# Patient Record
Sex: FEMALE | Race: WHITE | NOT HISPANIC OR LATINO | URBAN - METROPOLITAN AREA
[De-identification: names, ages, dates, MRNs, and addresses within clinical notes are randomized per-mention and may not be internally consistent; named-entity substitution may affect disease eponyms.]

---

## 2020-12-07 ENCOUNTER — TELEPHONE (OUTPATIENT)
Dept: PSYCHIATRY | Facility: CLINIC | Age: 25
End: 2020-12-07

## 2024-07-04 ENCOUNTER — HOSPITAL ENCOUNTER (EMERGENCY)
Facility: HOSPITAL | Age: 29
Discharge: HOME | End: 2024-07-04
Attending: EMERGENCY MEDICINE
Payer: COMMERCIAL

## 2024-07-04 ENCOUNTER — APPOINTMENT (EMERGENCY)
Dept: CT IMAGING | Facility: HOSPITAL | Age: 29
End: 2024-07-04
Payer: COMMERCIAL

## 2024-07-04 VITALS
OXYGEN SATURATION: 100 % | WEIGHT: 176.59 LBS | SYSTOLIC BLOOD PRESSURE: 112 MMHG | HEART RATE: 84 BPM | TEMPERATURE: 97.5 F | DIASTOLIC BLOOD PRESSURE: 58 MMHG | RESPIRATION RATE: 16 BRPM

## 2024-07-04 DIAGNOSIS — N20.1 URETERAL STONE: Primary | ICD-10-CM

## 2024-07-04 LAB
ALBUMIN SERPL BCG-MCNC: 4.4 G/DL (ref 3.5–5)
ALP SERPL-CCNC: 49 U/L (ref 34–104)
ALT SERPL W P-5'-P-CCNC: 11 U/L (ref 7–52)
ANION GAP SERPL CALCULATED.3IONS-SCNC: 7 MMOL/L (ref 4–13)
AST SERPL W P-5'-P-CCNC: 13 U/L (ref 13–39)
BACTERIA UR QL AUTO: ABNORMAL /HPF
BASOPHILS # BLD AUTO: 0.04 THOUSANDS/ÂΜL (ref 0–0.1)
BASOPHILS NFR BLD AUTO: 0 % (ref 0–1)
BILIRUB SERPL-MCNC: 0.65 MG/DL (ref 0.2–1)
BILIRUB UR QL STRIP: ABNORMAL
BUN SERPL-MCNC: 17 MG/DL (ref 5–25)
CALCIUM SERPL-MCNC: 9.2 MG/DL (ref 8.4–10.2)
CHLORIDE SERPL-SCNC: 107 MMOL/L (ref 96–108)
CLARITY UR: ABNORMAL
CO2 SERPL-SCNC: 25 MMOL/L (ref 21–32)
COLOR UR: YELLOW
CREAT SERPL-MCNC: 0.85 MG/DL (ref 0.6–1.3)
EOSINOPHIL # BLD AUTO: 0.2 THOUSAND/ÂΜL (ref 0–0.61)
EOSINOPHIL NFR BLD AUTO: 2 % (ref 0–6)
ERYTHROCYTE [DISTWIDTH] IN BLOOD BY AUTOMATED COUNT: 13.2 % (ref 11.6–15.1)
EXT PREGNANCY TEST URINE: NEGATIVE
EXT. CONTROL: NORMAL
GFR SERPL CREATININE-BSD FRML MDRD: 92 ML/MIN/1.73SQ M
GLUCOSE SERPL-MCNC: 97 MG/DL (ref 65–140)
GLUCOSE UR STRIP-MCNC: NEGATIVE MG/DL
HCT VFR BLD AUTO: 37.5 % (ref 34.8–46.1)
HGB BLD-MCNC: 12 G/DL (ref 11.5–15.4)
HGB UR QL STRIP.AUTO: ABNORMAL
IMM GRANULOCYTES # BLD AUTO: 0.05 THOUSAND/UL (ref 0–0.2)
IMM GRANULOCYTES NFR BLD AUTO: 1 % (ref 0–2)
KETONES UR STRIP-MCNC: ABNORMAL MG/DL
LEUKOCYTE ESTERASE UR QL STRIP: NEGATIVE
LYMPHOCYTES # BLD AUTO: 2.46 THOUSANDS/ÂΜL (ref 0.6–4.47)
LYMPHOCYTES NFR BLD AUTO: 27 % (ref 14–44)
MCH RBC QN AUTO: 28.1 PG (ref 26.8–34.3)
MCHC RBC AUTO-ENTMCNC: 32 G/DL (ref 31.4–37.4)
MCV RBC AUTO: 88 FL (ref 82–98)
MONOCYTES # BLD AUTO: 0.55 THOUSAND/ÂΜL (ref 0.17–1.22)
MONOCYTES NFR BLD AUTO: 6 % (ref 4–12)
MUCOUS THREADS UR QL AUTO: ABNORMAL
NEUTROPHILS # BLD AUTO: 5.94 THOUSANDS/ÂΜL (ref 1.85–7.62)
NEUTS SEG NFR BLD AUTO: 64 % (ref 43–75)
NITRITE UR QL STRIP: NEGATIVE
NON-SQ EPI CELLS URNS QL MICRO: ABNORMAL /HPF
NRBC BLD AUTO-RTO: 0 /100 WBCS
PH UR STRIP.AUTO: 6 [PH]
PLATELET # BLD AUTO: 178 THOUSANDS/UL (ref 149–390)
PMV BLD AUTO: 11.4 FL (ref 8.9–12.7)
POTASSIUM SERPL-SCNC: 3.7 MMOL/L (ref 3.5–5.3)
PROT SERPL-MCNC: 7 G/DL (ref 6.4–8.4)
PROT UR STRIP-MCNC: ABNORMAL MG/DL
RBC # BLD AUTO: 4.27 MILLION/UL (ref 3.81–5.12)
RBC #/AREA URNS AUTO: ABNORMAL /HPF
SODIUM SERPL-SCNC: 139 MMOL/L (ref 135–147)
SP GR UR STRIP.AUTO: >=1.03 (ref 1–1.03)
UROBILINOGEN UR QL STRIP.AUTO: 1 E.U./DL
WBC # BLD AUTO: 9.24 THOUSAND/UL (ref 4.31–10.16)
WBC #/AREA URNS AUTO: ABNORMAL /HPF

## 2024-07-04 PROCEDURE — 99285 EMERGENCY DEPT VISIT HI MDM: CPT | Performed by: EMERGENCY MEDICINE

## 2024-07-04 PROCEDURE — 36415 COLL VENOUS BLD VENIPUNCTURE: CPT | Performed by: EMERGENCY MEDICINE

## 2024-07-04 PROCEDURE — 80053 COMPREHEN METABOLIC PANEL: CPT | Performed by: EMERGENCY MEDICINE

## 2024-07-04 PROCEDURE — 74177 CT ABD & PELVIS W/CONTRAST: CPT

## 2024-07-04 PROCEDURE — 85025 COMPLETE CBC W/AUTO DIFF WBC: CPT | Performed by: EMERGENCY MEDICINE

## 2024-07-04 PROCEDURE — 81001 URINALYSIS AUTO W/SCOPE: CPT | Performed by: EMERGENCY MEDICINE

## 2024-07-04 PROCEDURE — 81025 URINE PREGNANCY TEST: CPT | Performed by: EMERGENCY MEDICINE

## 2024-07-04 RX ORDER — ONDANSETRON 2 MG/ML
4 INJECTION INTRAMUSCULAR; INTRAVENOUS ONCE
Status: COMPLETED | OUTPATIENT
Start: 2024-07-04 | End: 2024-07-04

## 2024-07-04 RX ORDER — ONDANSETRON 4 MG/1
4 TABLET, ORALLY DISINTEGRATING ORAL EVERY 6 HOURS PRN
Qty: 20 TABLET | Refills: 0 | Status: SHIPPED | OUTPATIENT
Start: 2024-07-04

## 2024-07-04 RX ORDER — TAMSULOSIN HYDROCHLORIDE 0.4 MG/1
0.8 CAPSULE ORAL
Qty: 14 CAPSULE | Refills: 0 | Status: SHIPPED | OUTPATIENT
Start: 2024-07-04 | End: 2024-07-11

## 2024-07-04 RX ORDER — KETOROLAC TROMETHAMINE 30 MG/ML
30 INJECTION, SOLUTION INTRAMUSCULAR; INTRAVENOUS ONCE
Status: COMPLETED | OUTPATIENT
Start: 2024-07-04 | End: 2024-07-04

## 2024-07-04 RX ADMIN — KETOROLAC TROMETHAMINE 30 MG: 30 INJECTION, SOLUTION INTRAMUSCULAR at 21:24

## 2024-07-04 RX ADMIN — SODIUM CHLORIDE 1000 ML: 0.9 INJECTION, SOLUTION INTRAVENOUS at 21:14

## 2024-07-04 RX ADMIN — ONDANSETRON 4 MG: 2 INJECTION INTRAMUSCULAR; INTRAVENOUS at 21:17

## 2024-07-04 RX ADMIN — IOHEXOL 100 ML: 350 INJECTION, SOLUTION INTRAVENOUS at 22:17

## 2024-07-04 NOTE — Clinical Note
Jelena Garcia was seen and treated in our emergency department on 7/4/2024.                Diagnosis: kidney stone    Jelena  .    She may return on this date:     Jelena was seen in ED 7/4/24 and may return to work today    Please call the ED with any questions you may have  [phone]       If you have any questions or concerns, please don't hesitate to call.      Herman Thomas MD    ______________________________           _______________          _______________  Hospital Representative                              Date                                Time

## 2024-07-05 NOTE — DISCHARGE INSTRUCTIONS
You may use ibuprofen 600 mg every 6-8 hours as needed for pain    Please use the urine strainers and bring any stones that you may find to the follow-up urology appointment.  You may follow-up with the urologist listed below or the urologist of your choice    Please take the Flomax prescribed to help pass the stone.  You may use the nausea medication prescribed on an as-needed basis as well.

## 2024-07-05 NOTE — ED PROVIDER NOTES
History  Chief Complaint   Patient presents with    Abdominal Pain     Patient reports she had internal US last week noting a cyst but she is unsure where. Reports LLQ pain, pain got worse 2 nights ago. Reports vomiting due to pain. Denies fever.      Had ultrasound June 24, noted to have fibroids and nabothian cyst, states over the past 2 days increased left-sided abdominal pain with some nausea/vomiting.  No fevers or chills.  No dysuria.      History provided by:  Patient   used: No    Abdominal Pain  Pain location:  LLQ  Pain quality: aching and sharp    Pain radiates to:  Does not radiate  Pain severity:  Moderate  Onset quality:  Gradual  Duration:  2 days  Timing:  Constant  Chronicity:  New  Relieved by:  Nothing  Worsened by:  Nothing  Ineffective treatments:  None tried  Associated symptoms: nausea and vomiting    Associated symptoms: no chest pain, no chills, no constipation, no cough, no diarrhea, no dysuria, no fever, no hematemesis, no hematochezia, no hematuria, no shortness of breath and no sore throat        None       Past Medical History:   Diagnosis Date    ADHD     Anxiety     Depression     PTSD (post-traumatic stress disorder)        History reviewed. No pertinent surgical history.    History reviewed. No pertinent family history.  I have reviewed and agree with the history as documented.    E-Cigarette/Vaping    E-Cigarette Use Current Every Day User      E-Cigarette/Vaping Substances     Social History     Tobacco Use    Smoking status: Never    Smokeless tobacco: Never   Vaping Use    Vaping status: Every Day   Substance Use Topics    Alcohol use: Yes       Review of Systems   Constitutional:  Negative for chills and fever.   HENT:  Negative for ear pain, hearing loss, sore throat, trouble swallowing and voice change.    Eyes:  Negative for pain and discharge.   Respiratory:  Negative for cough, shortness of breath and wheezing.    Cardiovascular:  Negative for chest  pain and palpitations.   Gastrointestinal:  Positive for abdominal pain, nausea and vomiting. Negative for blood in stool, constipation, diarrhea, hematemesis and hematochezia.   Genitourinary:  Negative for dysuria, flank pain, frequency and hematuria.   Musculoskeletal:  Negative for joint swelling, neck pain and neck stiffness.   Skin:  Negative for rash and wound.   Neurological:  Negative for dizziness, seizures, syncope, facial asymmetry and headaches.   Psychiatric/Behavioral:  Negative for hallucinations, self-injury and suicidal ideas.    All other systems reviewed and are negative.      Physical Exam  Physical Exam  Vitals and nursing note reviewed.   Constitutional:       General: She is not in acute distress.     Appearance: She is well-developed.   HENT:      Head: Normocephalic and atraumatic.      Right Ear: External ear normal.      Left Ear: External ear normal.   Eyes:      General: No scleral icterus.        Right eye: No discharge.         Left eye: No discharge.      Extraocular Movements: Extraocular movements intact.      Conjunctiva/sclera: Conjunctivae normal.   Cardiovascular:      Rate and Rhythm: Normal rate and regular rhythm.      Heart sounds: Normal heart sounds. No murmur heard.  Pulmonary:      Effort: Pulmonary effort is normal.      Breath sounds: Normal breath sounds. No wheezing or rales.   Abdominal:      General: Bowel sounds are normal. There is no distension.      Palpations: Abdomen is soft.      Tenderness: There is abdominal tenderness in the left lower quadrant. There is no guarding or rebound. Negative signs include Escobar's sign and McBurney's sign.   Musculoskeletal:         General: No deformity. Normal range of motion.      Cervical back: Normal range of motion and neck supple.   Skin:     General: Skin is warm and dry.      Findings: No rash.   Neurological:      General: No focal deficit present.      Mental Status: She is alert and oriented to person, place, and  time.      Cranial Nerves: No cranial nerve deficit.   Psychiatric:         Mood and Affect: Mood normal.         Behavior: Behavior normal.         Thought Content: Thought content normal.         Judgment: Judgment normal.         Vital Signs  ED Triage Vitals   Temperature Pulse Respirations Blood Pressure SpO2   07/04/24 2109 07/04/24 2109 07/04/24 2109 07/04/24 2110 07/04/24 2109   97.5 °F (36.4 °C) 70 18 112/73 97 %      Temp Source Heart Rate Source Patient Position - Orthostatic VS BP Location FiO2 (%)   07/04/24 2109 07/04/24 2109 07/04/24 2109 07/04/24 2109 --   Temporal Monitor Sitting Right arm       Pain Score       07/04/24 2109       10 - Worst Possible Pain           Vitals:    07/04/24 2130 07/04/24 2200 07/04/24 2230 07/04/24 2300   BP: 132/74 100/61 120/56 112/58   Pulse: (!) 50 71 81 84   Patient Position - Orthostatic VS: Sitting Sitting Sitting Sitting         Visual Acuity      ED Medications  Medications   ketorolac (TORADOL) injection 30 mg (30 mg Intravenous Given 7/4/24 2124)   sodium chloride 0.9 % bolus 1,000 mL (0 mL Intravenous Stopped 7/4/24 2220)   ondansetron (ZOFRAN) injection 4 mg (4 mg Intravenous Given 7/4/24 2117)   iohexol (OMNIPAQUE) 350 MG/ML injection (MULTI-DOSE) 100 mL (100 mL Intravenous Given 7/4/24 2217)       Diagnostic Studies  Results Reviewed       Procedure Component Value Units Date/Time    Comprehensive metabolic panel [[id number]] Collected: 07/04/24 2116    Lab Status: Final result Specimen: Blood from Arm, Left Updated: 07/04/24 2141     Sodium 139 mmol/L      Potassium 3.7 mmol/L      Chloride 107 mmol/L      CO2 25 mmol/L      ANION GAP 7 mmol/L      BUN 17 mg/dL      Creatinine 0.85 mg/dL      Glucose 97 mg/dL      Calcium 9.2 mg/dL      AST 13 U/L      ALT 11 U/L      Alkaline Phosphatase 49 U/L      Total Protein 7.0 g/dL      Albumin 4.4 g/dL      Total Bilirubin 0.65 mg/dL      eGFR 92 ml/min/1.73sq m     Narrative:      National Kidney Disease  Foundation guidelines for Chronic Kidney Disease (CKD):     Stage 1 with normal or high GFR (GFR > 90 mL/min/1.73 square meters)    Stage 2 Mild CKD (GFR = 60-89 mL/min/1.73 square meters)    Stage 3A Moderate CKD (GFR = 45-59 mL/min/1.73 square meters)    Stage 3B Moderate CKD (GFR = 30-44 mL/min/1.73 square meters)    Stage 4 Severe CKD (GFR = 15-29 mL/min/1.73 square meters)    Stage 5 End Stage CKD (GFR <15 mL/min/1.73 square meters)  Note: GFR calculation is accurate only with a steady state creatinine    Urine Microscopic [[id number]]  (Abnormal) Collected: 07/04/24 2124    Lab Status: Final result Specimen: Urine, Clean Catch Updated: 07/04/24 2138     RBC, UA Innumerable /hpf      WBC, UA 2-4 /hpf      Epithelial Cells Moderate /hpf      Bacteria, UA Moderate /hpf      MUCUS THREADS Occasional    UA w Reflex to Microscopic w Reflex to Culture [[id number]]  (Abnormal) Collected: 07/04/24 2124    Lab Status: Final result Specimen: Urine, Clean Catch Updated: 07/04/24 2132     Color, UA Yellow     Clarity, UA Slightly Cloudy     Specific Gravity, UA >=1.030     pH, UA 6.0     Leukocytes, UA Negative     Nitrite, UA Negative     Protein, UA Trace mg/dl      Glucose, UA Negative mg/dl      Ketones, UA Trace mg/dl      Urobilinogen, UA 1.0 E.U./dl      Bilirubin, UA Small     Occult Blood, UA Large    POCT pregnancy, urine [[id number]]  (Normal) Resulted: 07/04/24 2124    Lab Status: Final result Updated: 07/04/24 2124     EXT Preg Test, Ur Negative     Control Valid    CBC and differential [[id number]] Collected: 07/04/24 2116    Lab Status: Final result Specimen: Blood from Arm, Left Updated: 07/04/24 2123     WBC 9.24 Thousand/uL      RBC 4.27 Million/uL      Hemoglobin 12.0 g/dL      Hematocrit 37.5 %      MCV 88 fL      MCH 28.1 pg      MCHC 32.0 g/dL      RDW 13.2 %      MPV 11.4 fL      Platelets 178 Thousands/uL      nRBC 0 /100 WBCs      Segmented % 64 %      Immature Grans % 1 %      Lymphocytes % 27 %       Monocytes % 6 %      Eosinophils Relative 2 %      Basophils Relative 0 %      Absolute Neutrophils 5.94 Thousands/µL      Absolute Immature Grans 0.05 Thousand/uL      Absolute Lymphocytes 2.46 Thousands/µL      Absolute Monocytes 0.55 Thousand/µL      Eosinophils Absolute 0.20 Thousand/µL      Basophils Absolute 0.04 Thousands/µL                    CT abdomen pelvis w contrast   Final Result by Daphne Weston MD (07/04 2313)      5 mm distal left ureteral calculus in the pelvis causing mild hydroureteronephrosis.      The study was marked in EPIC for immediate notification.         Workstation performed: [id number]                    Procedures  Procedures         ED Course  ED Course as of 07/04/24 2336   Thu Jul 04, 2024 2213 Pain improved after treatment in the ED.  Lab work is benign.  Awaiting results CT abdomen pelvis at this time.                               SBIRT 20yo+      Flowsheet Row Most Recent Value   Initial Alcohol Screen: US AUDIT-C     1. How often do you have a drink containing alcohol? 0 Filed at: 07/04/2024 2109   2. How many drinks containing alcohol do you have on a typical day you are drinking?  0 Filed at: 07/04/2024 2109   3b. FEMALE Any Age, or MALE 65+: How often do you have 4 or more drinks on one occassion? 0 Filed at: 07/04/2024 2109   Audit-C Score 0 Filed at: 07/04/2024 2109                      Medical Decision Making  Based on the history and medical screening exam performed the diagnostic considerations include but are not limited to ovarian cyst, fibroids, diverticulitis, colitis, pyelonephritis, urinary tract infection, kidney stone.    Based on the work-up performed in the emergency room which includes physical examination, and which may include laboratory studies and imaging as warranted including advanced imaging such as CT scan or ultrasound, the diagnostic considerations are narrowed to exclude limb or life-threatening process.    The patient is stable for  discharge.  Lab work and UA negative.  CT abdomen pelvis reveals 5 mm left ureteral stone.  Provided with urine strainers, prescriptions for Flomax/Zofran.  Since she had significant relief from Toradol in ER advised to use ibuprofen on an as-needed basis.  Patient provided with outpatient follow-up for urology.    Amount and/or Complexity of Data Reviewed  Labs: ordered. Decision-making details documented in ED Course.     Details: Benign  Radiology: ordered and independent interpretation performed. Decision-making details documented in ED Course.     Details: CT abdomen pelvis reveals a 5 mm left ureteral stone    Risk  Prescription drug management.             Disposition  Final diagnoses:   Ureteral stone     Time reflects when diagnosis was documented in both MDM as applicable and the Disposition within this note       Time User Action Codes Description Comment    7/4/2024 11:21 PM Herman Thomas Add [N20.1] Ureteral stone           ED Disposition       ED Disposition   Discharge    Condition   Stable    Date/Time   Thu Jul 4, 2024 2321    Comment   Jelena Garcia discharge to home/self care.                   Follow-up Information       Follow up With Specialties Details Why Contact Info Additional Information    ALMA Curtis Nurse Practitioner   [address] [facility] [address]  [phone]       [facility]   [address]  [address]  [facility] [address]  [phone] [facility] [address], [address], [place], [address]     [phone]            Discharge Medication List as of 7/4/2024 11:24 PM        START taking these medications    Details   ondansetron (Zofran ODT) 4 mg disintegrating tablet Take 1 tablet (4 mg total) by mouth every 6 (six) hours as needed for nausea or vomiting, Starting Thu 7/4/2024, Normal      tamsulosin (FLOMAX) 0.4 mg Take 2  capsules (0.8 mg total) by mouth daily with dinner for 7 days, Starting Thu 7/4/2024, Until Thu 7/11/2024, Normal             No discharge procedures on file.    PDMP Review       None            ED Provider  Electronically Signed by             Herman Thomas MD  07/04/24 8417

## 2024-07-08 ENCOUNTER — NURSE TRIAGE (OUTPATIENT)
Age: 29
End: 2024-07-08

## 2024-07-08 NOTE — TELEPHONE ENCOUNTER
Regardin mm distal left ureteral calculus in the pelvis causing mild hydroureteronephrosis.  ----- Message from Fabricio JONES sent at 2024  4:07 PM EDT -----  New Patient    What is the reason for the patient's appointment?: pt called to schedule an appt for ER follow up for   5 mm distal left ureteral calculus in the pelvis causing mild hydroureteronephrosis.   Pt is in pain and nothing available until mid August.   Please review     What office location does the patient prefer?: [place] or [place]     Does patient have Imaging/Lab Results:    Have patient records been requested?:  If No, are the records showing in Epic:       INSURANCE:   Do we accept the patient's insurance or is the patient Self-Pay?:    Insurance Provider: [employer]   Plan Type/Number:   Member ID#:       HISTORY:   Has the patient had any previous Urologist(s)?: NO     Was the patient seen in the ED?: Yes     Has the patient had any outside testing done?: No     Does the patient have a personal history of cancer? No

## 2024-07-09 NOTE — TELEPHONE ENCOUNTER
Attempted to reach patient on 7/8/2024 to schedule a new patient appointment. Called patient and left a voicemail to contact the office and schedule for [place] office.

## 2024-07-10 NOTE — TELEPHONE ENCOUNTER
2nd voicemail message left for patient to contact the office to confirm 7/23 appointment with ALMA Edwards.

## 2024-07-11 NOTE — TELEPHONE ENCOUNTER
Contacted patient and left 3rd voicemail to please contact the office to confirm her NP appointment. Attempted to contact secondary number on file as well, however phone is out of service. Patient does not have a communication consent on file to contact anyone else. Will have clerical send a letter to her home address regarding 7/23 appointment.

## 2024-07-23 ENCOUNTER — OFFICE VISIT (OUTPATIENT)
Dept: UROLOGY | Facility: CLINIC | Age: 29
End: 2024-07-23
Payer: COMMERCIAL

## 2024-07-23 VITALS
HEART RATE: 73 BPM | HEIGHT: 64 IN | OXYGEN SATURATION: 100 % | BODY MASS INDEX: 28.51 KG/M2 | WEIGHT: 167 LBS | DIASTOLIC BLOOD PRESSURE: 78 MMHG | SYSTOLIC BLOOD PRESSURE: 114 MMHG

## 2024-07-23 DIAGNOSIS — N20.1 URETEROLITHIASIS: Primary | ICD-10-CM

## 2024-07-23 PROCEDURE — 99203 OFFICE O/P NEW LOW 30 MIN: CPT

## 2024-07-23 RX ORDER — ALBUTEROL SULFATE 90 UG/1
2 AEROSOL, METERED RESPIRATORY (INHALATION) EVERY 6 HOURS PRN
COMMUNITY
Start: 2024-04-24

## 2024-07-23 RX ORDER — BUPROPION HYDROCHLORIDE 150 MG/1
1 TABLET ORAL EVERY MORNING
COMMUNITY
Start: 2024-07-01 | End: 2025-07-01

## 2024-07-23 NOTE — PROGRESS NOTES
7/23/2024    Chief Complaint   Patient presents with    Follow-up     NEWP to be established for ER follow-up for flank pain/stone. Pt states pain is better but does not believe she passed any        Assessment and Plan    29 y.o. female new patient to office    Ureterolithiasis   Acute ER visit on 7/4 due to left lower quadrant abdominal pain.  Diagnosed with a 5 mm distal left ureteral calculi.  She is nontoxic or ill-appearing today.  She last experienced pain approximately 1-1/2 weeks ago.  She did not visualize passing the stone but denies any pain or gross hematuria.  I suspect she likely passed the stone but I would like to check a ultrasound and KUB in approxi-1 week.  I will call with those results.  I suspect the cause of her stone is simply from dehydration.  This is her first stone episode ever.  We did discuss the importance of maintaining adequate hydration.      History of Present Illness  Jelena Garcia is a 29 y.o. female new patient to office with PMHx significant for. Here for evaluation of flank pain.    Patient was seen at [facility] emergency department on 7/4/2024 for evaluation of left lower quadrant abdominal pain.  CT imaging showed a 5 mm distal left ureteral calculi in the pelvis causing mild hydronephrosis.  Review of labs showed a negative leukocytosis, normal renal function, and microscopic analysis showed innumerable RBCs, 2-4 WBCs, and moderate bacteria.  She was discharged for medical expulsive therapy with referral to urology.      Review of Systems   Constitutional:  Negative for chills and fever.   HENT:  Negative for ear pain and sore throat.    Eyes:  Negative for pain and visual disturbance.   Respiratory:  Negative for cough and shortness of breath.    Cardiovascular:  Negative for chest pain and palpitations.   Gastrointestinal:  Negative for abdominal pain and vomiting.   Genitourinary:  Negative for dysuria and hematuria.   Musculoskeletal:  Negative for arthralgias and back  "pain.   Skin:  Negative for color change and rash.   Neurological:  Negative for seizures and syncope.   All other systems reviewed and are negative.              Vitals  Vitals:    07/23/24 1145   BP: 114/78   Pulse: 73   SpO2: 100%   Weight: 75.8 kg (167 lb)   Height: 5' 4\" (1.626 m)       Physical Exam  Vitals reviewed.   Constitutional:       General: She is not in acute distress.     Appearance: Normal appearance. She is normal weight. She is not ill-appearing or toxic-appearing.   HENT:      Head: Normocephalic and atraumatic.      Nose: Nose normal.   Eyes:      General: No scleral icterus.     Conjunctiva/sclera: Conjunctivae normal.   Cardiovascular:      Rate and Rhythm: Normal rate.      Pulses: Normal pulses.   Pulmonary:      Effort: Pulmonary effort is normal. No respiratory distress.   Abdominal:      General: Abdomen is flat.      Palpations: Abdomen is soft.      Tenderness: There is no abdominal tenderness. There is no right CVA tenderness or left CVA tenderness.      Hernia: No hernia is present.   Musculoskeletal:         General: Normal range of motion.      Cervical back: Normal range of motion.   Skin:     General: Skin is warm and dry.   Neurological:      General: No focal deficit present.      Mental Status: She is alert and oriented to person, place, and time. Mental status is at baseline.   Psychiatric:         Mood and Affect: Mood normal.         Behavior: Behavior normal.         Thought Content: Thought content normal.         Judgment: Judgment normal.         Past History  Past Medical History:   Diagnosis Date    ADHD     Anxiety     Depression     PTSD (post-traumatic stress disorder)      Social History     Socioeconomic History    Marital status: Single     Spouse name: None    Number of children: None    Years of education: None    Highest education level: None   Occupational History    None   Tobacco Use    Smoking status: Never    Smokeless tobacco: Never   Vaping Use    " Vaping status: Every Day    Substances: THC, CBD   Substance and Sexual Activity    Alcohol use: Yes    Drug use: Yes     Types: Marijuana    Sexual activity: Not Currently   Other Topics Concern    None   Social History Narrative    None     Social Determinants of Health     Financial Resource Strain: High Risk (4/24/2024)    Received from [facility]    Overall Financial Resource Strain (CARDIA)     Difficulty of Paying Living Expenses: Very hard   Food Insecurity: Food Insecurity Present (4/24/2024)    Received from [facility]    Hunger Vital Sign     Worried About Running Out of Food in the Last Year: Sometimes true     Ran Out of Food in the Last Year: Sometimes true   Transportation Needs: No Transportation Needs (4/24/2024)    Received from [facility]    PRAPARE - Transportation     Lack of Transportation (Medical): No     Lack of Transportation (Non-Medical): No   Physical Activity: Not on file   Stress: Stress Concern Present (4/24/2024)    Received from [facility]    [place] [employer] - Occupational Stress Questionnaire     Feeling of Stress : Very much   Social Connections: Unknown (6/18/2024)    Received from [facility]     How often do you feel lonely or isolated from those around you? (Adult - for ages 18 years and over): Not on file   Recent Concern: Social Connections - Feeling Somewhat Isolated (4/24/2024)    Received from [facility]    OASIS : Social Isolation     How often do you feel lonely or isolated from those around you?: Sometimes   Intimate Partner Violence: Not At Risk (4/24/2024)    Received from [facility]    Humiliation, Afraid, Rape, and Kick questionnaire     Fear of Current or Ex-Partner: No     Emotionally Abused: No     Physically Abused: No     Sexually Abused: No   Housing Stability: Low Risk  (4/24/2024)    Received from  [facility]    Housing Stability Vital Sign     Unable to Pay for Housing in the Last Year: No     Number of Times Moved in the Last Year: 1     Homeless in the Last Year: No     Social History     Tobacco Use   Smoking Status Never   Smokeless Tobacco Never     History reviewed. No pertinent family history.    The following portions of the patient's history were reviewed and updated as appropriate allergies, current medications, past medical history, past social history, past surgical history and problem list    [id number]    CT ABDOMEN AND PELVIS WITH IV CONTRAST     INDICATION: abdominal pain.     COMPARISON: None.     TECHNIQUE: CT examination of the abdomen and pelvis was performed. Multiplanar 2D reformatted images were created from the source data.     This examination, like all CT scans performed in the [facility], was performed utilizing techniques to minimize radiation dose exposure, including the use of iterative reconstruction and automated exposure control. Radiation dose length   product (DLP) for this visit: 626 mGy-cm     IV Contrast: 100 mL of iohexol (OMNIPAQUE)  Enteric Contrast: Not administered.     FINDINGS:     ABDOMEN     LOWER CHEST: Minimal bibasilar atelectasis.     LIVER/BILIARY TREE: Nonspecific mild periportal edema. No suspicious hepatic lesion. No biliary dilatation.     GALLBLADDER: Contracted. No calcified gallstones. No pericholecystic inflammatory change.     SPLEEN: Unremarkable.     PANCREAS: Unremarkable.     ADRENAL GLANDS: Unremarkable.     KIDNEYS/URETERS: 5 mm distal left ureteral calculus in the pelvis causing mild hydroureteronephrosis and delayed nephrogram. Right kidney is unremarkable.     STOMACH AND BOWEL: Unremarkable.     APPENDIX: Normal.     ABDOMINOPELVIC CAVITY: Small volume of free pelvic fluid, likely physiologic given the patient's age and gender. No pneumoperitoneum. No lymphadenopathy.     VESSELS: Unremarkable  "for patient's age.     PELVIS     REPRODUCTIVE ORGANS: Unremarkable for patient's age.     URINARY BLADDER: Decompressed.     ABDOMINAL WALL/INGUINAL REGIONS: Unremarkable.     BONES: No acute fracture or suspicious osseous lesion.     IMPRESSION:     5 mm distal left ureteral calculus in the pelvis causing mild hydroureteronephrosis.     The study was marked in EPIC for immediate notification.    Results  No results found for this or any previous visit (from the past 1 hour(s)).]  No results found for: \"PSA\"  Lab Results   Component Value Date    CALCIUM 9.2 07/04/2024    K 3.7 07/04/2024    CO2 25 07/04/2024     07/04/2024    BUN 17 07/04/2024    CREATININE 0.85 07/04/2024     Lab Results   Component Value Date    WBC 9.24 07/04/2024    HGB 12.0 07/04/2024    HCT 37.5 07/04/2024    MCV 88 07/04/2024     07/04/2024       Please Note:  Voice dictation software has been used to create this document. There may be inadvertent transcriptions errors.     ALMA Spear 07/23/24   "

## 2024-07-29 ENCOUNTER — HOSPITAL ENCOUNTER (OUTPATIENT)
Dept: RADIOLOGY | Facility: HOSPITAL | Age: 29
Discharge: HOME | End: 2024-07-29
Payer: COMMERCIAL

## 2024-07-29 ENCOUNTER — HOSPITAL ENCOUNTER (OUTPATIENT)
Dept: ULTRASOUND IMAGING | Facility: HOSPITAL | Age: 29
Discharge: HOME | End: 2024-07-29
Payer: COMMERCIAL

## 2024-07-29 ENCOUNTER — TELEPHONE (OUTPATIENT)
Dept: OTHER | Facility: HOSPITAL | Age: 29
End: 2024-07-29

## 2024-07-29 DIAGNOSIS — N20.1 URETEROLITHIASIS: ICD-10-CM

## 2024-07-29 PROCEDURE — 74018 RADEX ABDOMEN 1 VIEW: CPT

## 2024-07-29 PROCEDURE — 76775 US EXAM ABDO BACK WALL LIM: CPT

## 2024-07-29 NOTE — PROGRESS NOTES
I attempted to call patient to let her know that her ureteral calculi still present.  Surgical case was requested.  Please provide ER precautions for any severe pain fevers or chills.  I recommend if she has any of those symptoms she proceed to [facility] as we do not always have someone onsite at [facility]

## 2024-07-29 NOTE — RESULT ENCOUNTER NOTE
Attempted to call patient to let her know that her ureteral calculus is still present.  I ordered a surgical case for her for removal.  She should continue to hydrate and strain all her urine.  Please provide ER precautions.  If she develops any fevers chills or severe pain I recommend she go to the [facility] as we do not always have surgeon available at [facility].  Nonurgent case was requested for [facility].

## 2024-07-29 NOTE — TELEPHONE ENCOUNTER
Attempted to call patient to give her results of ultrasound and KUB.  Got patient's voicemail and let her know we will be ordering surgical procedure and she can call back to the office for further discussion

## 2024-07-29 NOTE — LETTER
[facility]  [address] [place] [address]      July 31, 2024    MRN: [id number]     Phone: [phone]     Dear Ms. Radha,    You recently had a(n) Ultrasound performed on 7/29/2024 at  [facility] that was requested by ALMA Spear. The study was reviewed by a radiologist, which is a physician who specializes in medical imaging. The radiologist issued a report describing his or her findings. In that report there was a finding that the radiologist felt warranted further discussion with your health care provider and that discussion would be beneficial to you.      The results were sent to ALMA Spear on 07/29/2024  2:59 PM. We recommend that you contact ALMA Spear at [phone] or set up an appointment to discuss the results of the imaging test. If you have already heard from ALMA Spear regarding the results of your study, you can disregard this letter.     This letter is not meant to alarm you, but intended to encourage you to follow-up on your results with the provider that sent you for the imaging study. In addition, we have enclosed answers to frequently asked questions by other patients who have also received a letter to review results with their health care provider (see page two).      Thank you for choosing [facility] for your medical imaging needs.                                                                                                                                                        FREQUENTLY ASKED QUESTIONS    Why am I receiving this letter?  [place] State Law requires us to notify patients who have findings on imaging exams that may require more testing or follow-up with a health professional within the next 3 months.        How serious is the finding on the imaging test?  This letter is sent to all patients who may need follow-up or more testing within the next 3 months.   Receiving this letter does not necessarily mean you have a life-threatening imaging finding or disease.  Recommendations in the radiologist’s imaging report are general in nature and it is up to your healthcare provider to say whether those recommendations make sense for your situation.  You are strongly encouraged to talk to your health care provider about the results and ask whether additional steps need to be taken.    Where can I get a copy of the final report for my recent radiology exam?  To get a full copy of the report you can access your records online at [email or web address] or please contact [facility]’s Medical Records Department at [phone] Monday through Friday between 8 am and 6 pm.         What do I need to do now?           Please contact your health care provider who requested the imaging study to discuss what further actions (if any) are needed.  You may have already heard from (your ordering provider) in regard to this test in which case you can disregard this letter.        NOTICE IN ACCORDANCE WITH THE [place] STATE “PATIENT TEST RESULT INFORMATION ACT OF 2018”    You are receiving this notice as a result of a determination by your diagnostic imaging service that further discussions of your test results are warranted and would be beneficial to you.    The complete results of your test or tests have been or will be sent to the health care practitioner that ordered the test or tests. It is recommended that you contact your health care practitioner to discuss your results as soon as possible.

## 2024-07-30 ENCOUNTER — TELEPHONE (OUTPATIENT)
Dept: UROLOGY | Facility: CLINIC | Age: 29
End: 2024-07-30

## 2024-07-30 NOTE — TELEPHONE ENCOUNTER
"Called and spoke with patient who stated \"who is calling?\" Advised I was calling from [facility] to review her imaging and patient stated \"I don't have time now, call back later\" and she disconnected the call.   "

## 2024-07-30 NOTE — TELEPHONE ENCOUNTER
ALMA Albarado at 7/29/2024  3:18 PM    Status: Signed   I attempted to call patient to let her know that her ureteral calculi still present.  Surgical case was requested.  Please provide ER precautions for any severe pain fevers or chills.  I recommend if she has any of those symptoms she proceed to [facility] as we do not always have someone onsite at [facility]

## 2024-07-30 NOTE — TELEPHONE ENCOUNTER
----- Message from ALMA Armijo sent at 7/29/2024  4:45 PM EDT -----  Attempted to call patient to let her know that her ureteral calculus is still present.  I ordered a surgical case for her for removal.  She should continue to hydrate and strain all her urine.  Please provide ER precautions.  If she develops any fevers chills or severe pain I recommend she go to the [facility] as we do not always have surgeon available at [facility].  Nonurgent case was requested for [facility].

## 2024-07-31 NOTE — TELEPHONE ENCOUNTER
Called and left a voicemail message for patient to contact the office to review her renal US/KUB results and ALMA Bergman's recommendations.

## 2024-08-05 ENCOUNTER — TELEPHONE (OUTPATIENT)
Dept: UROLOGY | Facility: CLINIC | Age: 29
End: 2024-08-05

## 2024-08-05 NOTE — TELEPHONE ENCOUNTER
Lmom offering to schedule stone removal 8/22 at [facility] with Dr Gilman. Provided my direct line to c/b